# Patient Record
Sex: MALE | Race: WHITE | URBAN - METROPOLITAN AREA
[De-identification: names, ages, dates, MRNs, and addresses within clinical notes are randomized per-mention and may not be internally consistent; named-entity substitution may affect disease eponyms.]

---

## 2021-05-03 ENCOUNTER — OFFICE VISIT (OUTPATIENT)
Dept: URBAN - METROPOLITAN AREA CLINIC 23 | Facility: CLINIC | Age: 81
End: 2021-05-03

## 2021-11-24 ENCOUNTER — TELEPHONE ENCOUNTER (OUTPATIENT)
Dept: URBAN - METROPOLITAN AREA CLINIC 19 | Facility: CLINIC | Age: 81
End: 2021-11-24

## 2021-11-24 RX ORDER — PANTOPRAZOLE SODIUM 40 MG/1
1 TABLET TABLET, DELAYED RELEASE ORAL BID
Qty: 180 TABLET | Refills: 1 | OUTPATIENT
Start: 2021-11-30

## 2022-02-04 ENCOUNTER — TELEPHONE ENCOUNTER (OUTPATIENT)
Dept: URBAN - METROPOLITAN AREA CLINIC 77 | Facility: CLINIC | Age: 82
End: 2022-02-04

## 2022-02-04 RX ORDER — PANTOPRAZOLE SODIUM 40 MG/1
1 TABLET TABLET, DELAYED RELEASE ORAL BID
Qty: 180 TABLET | Refills: 1
Start: 2021-11-30

## 2022-02-08 ENCOUNTER — OFFICE VISIT (OUTPATIENT)
Dept: URBAN - METROPOLITAN AREA CLINIC 23 | Facility: CLINIC | Age: 82
End: 2022-02-08
Payer: MEDICARE

## 2022-02-08 ENCOUNTER — LAB OUTSIDE AN ENCOUNTER (OUTPATIENT)
Dept: URBAN - METROPOLITAN AREA CLINIC 23 | Facility: CLINIC | Age: 82
End: 2022-02-08

## 2022-02-08 DIAGNOSIS — K55.20 ARTERIOVENOUS MALFORMATION OF INTESTINE: ICD-10-CM

## 2022-02-08 DIAGNOSIS — K92.1 MELENA: ICD-10-CM

## 2022-02-08 PROCEDURE — 99214 OFFICE O/P EST MOD 30 MIN: CPT | Performed by: INTERNAL MEDICINE

## 2022-02-08 PROCEDURE — G8420 CALC BMI NORM PARAMETERS: HCPCS | Performed by: INTERNAL MEDICINE

## 2022-02-08 PROCEDURE — G8427 DOCREV CUR MEDS BY ELIG CLIN: HCPCS | Performed by: INTERNAL MEDICINE

## 2022-02-08 PROCEDURE — 3017F COLORECTAL CA SCREEN DOC REV: CPT | Performed by: INTERNAL MEDICINE

## 2022-02-08 PROCEDURE — 1036F TOBACCO NON-USER: CPT | Performed by: INTERNAL MEDICINE

## 2022-02-08 PROCEDURE — G9622 NO UNHEAL ETOH USER: HCPCS | Performed by: INTERNAL MEDICINE

## 2022-02-08 PROCEDURE — G9903 PT SCRN TBCO ID AS NON USER: HCPCS | Performed by: INTERNAL MEDICINE

## 2022-02-08 RX ORDER — IRON HEME POLYPEPTIDE/FOLIC AC 12-1MG
TAKE 1 TABLET BY ORAL ROUTE ONCE DAILY WITH OR WITHOUT FOOD TABLET ORAL 1
Qty: 0 | Refills: 0 | Status: ACTIVE | COMMUNITY
Start: 1900-01-01

## 2022-02-08 RX ORDER — PANTOPRAZOLE SODIUM 40 MG/1
1 TABLET TABLET, DELAYED RELEASE ORAL BID
Qty: 180 TABLET | Refills: 1 | Status: ACTIVE | COMMUNITY
Start: 2021-11-30

## 2022-02-08 RX ORDER — FINASTERIDE 5 MG/1
TAKE 1 TABLET (5 MG) BY ORAL ROUTE ONCE DAILY TABLET, FILM COATED ORAL 1
Qty: 0 | Refills: 0 | Status: ACTIVE | COMMUNITY
Start: 1900-01-01

## 2022-02-08 RX ORDER — ATORVASTATIN CALCIUM 40 MG/1
TAKE 1 TABLET (40 MG) BY ORAL ROUTE ONCE DAILY TABLET, FILM COATED ORAL 1
Qty: 0 | Refills: 0 | Status: ACTIVE | COMMUNITY
Start: 1900-01-01

## 2022-02-08 RX ORDER — LISINOPRIL 10 MG/1
TAKE 1 TABLET (10 MG) BY ORAL ROUTE ONCE DAILY TABLET ORAL 1
Qty: 0 | Refills: 0 | Status: ACTIVE | COMMUNITY
Start: 1900-01-01

## 2022-02-08 RX ORDER — ASCORBIC ACID 500 MG
TABLET ORAL
Qty: 0 | Refills: 0 | Status: ACTIVE | COMMUNITY
Start: 1900-01-01

## 2022-02-08 RX ORDER — ESCITALOPRAM 10 MG/1
TAKE 1 TABLET (10 MG) BY ORAL ROUTE ONCE DAILY TABLET, FILM COATED ORAL 1
Qty: 0 | Refills: 0 | Status: ACTIVE | COMMUNITY
Start: 1900-01-01

## 2022-02-08 NOTE — PREVIOUS WORKUP REVIEWED
. ENDOSCOPIES  -Push enteroscopy 1/24/2020: A few nonbleeding angiectasia in the duodenum.  Treated with APC.  2 gastric polyps, resected and clips were placed.  Normal esophagus. *Pathology: Stomach polyps x2-hyperplastic polyps. -Colonoscopy 6/2/2019: hemorrhoids. Normal distal ileum. Two small angioectasias in the ascending colon, cauterized with APC. A 3 mm polyp in the ascending colon. Diverticulosis.-EGD 5/31/2019: normal esophagus. A 10 mm polyp without active bleeding but potential culprit of recent bleeding in the prepyloric region. It was removed with hot snare. Two clips placed. 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 Normal duodenum. *Pathology: gastric antrum polyp- regenerative/hyperplastic polyp. Ascending colon polyp was - acellular debris, no colonic tissue. -Capsule endoscopy 9/27/2017: multiple proximal small bowel nonbleeding AVMs. -Capsule endoscopy 10/29/2014: single possible AVM in the meat jejunum. Otherwise unremarkable. LABS -Labs 11/22/2021: WBC 5.8, hemoglobin 9.5, platelet 250.  Ferritin 9 L, iron saturation 6% L, vitamin B12 297. -Labs 6/19/2019: hemoglobin 11.5, platelet 215. Labs 6/12/2019: hemoglobin 11.5, platelet 206, WBC 7.3. -Labs 6/3/2019: hemoglobin 9.3, platelet 240, WBC 6.3. Labs 6/1/2019: hemoglobin 7.8.

## 2022-02-08 NOTE — HPI-TODAY'S VISIT:
81-year-old male with history of recurrent GI bleeding with AVM presents for intermittent black-colored stool for past months.  No other symptoms.  Is on baby aspirin.  History of coronary artery disease. He was off PPI for a while. Resumed PPI BID since his contact a week ago.

## 2022-02-09 LAB
HEMATOCRIT: 42
HEMOGLOBIN: 12.8
MCH: 27
MCHC: 30.5
MCV: 89
NRBC: (no result)
PLATELETS: 217
RBC: 4.74
RDW: 17.7
WBC: 6.2

## 2022-03-25 ENCOUNTER — OFFICE VISIT (OUTPATIENT)
Dept: URBAN - METROPOLITAN AREA MEDICAL CENTER 27 | Facility: MEDICAL CENTER | Age: 82
End: 2022-03-25

## 2022-04-19 ENCOUNTER — LAB OUTSIDE AN ENCOUNTER (OUTPATIENT)
Dept: URBAN - METROPOLITAN AREA MEDICAL CENTER 27 | Facility: MEDICAL CENTER | Age: 82
End: 2022-04-19

## 2022-04-19 ENCOUNTER — TELEPHONE ENCOUNTER (OUTPATIENT)
Dept: URBAN - METROPOLITAN AREA CLINIC 78 | Facility: CLINIC | Age: 82
End: 2022-04-19

## 2022-04-19 ENCOUNTER — OFFICE VISIT (OUTPATIENT)
Dept: URBAN - METROPOLITAN AREA MEDICAL CENTER 27 | Facility: MEDICAL CENTER | Age: 82
End: 2022-04-19
Payer: MEDICARE

## 2022-04-19 DIAGNOSIS — K22.2 ACQUIRED ESOPHAGEAL RING: ICD-10-CM

## 2022-04-19 DIAGNOSIS — K92.1 ACUTE MELENA: ICD-10-CM

## 2022-04-19 DIAGNOSIS — K22.89 OTHER SPECIFIED DISEASE OF ESOPHAGUS: ICD-10-CM

## 2022-04-19 PROCEDURE — 44360 SMALL BOWEL ENDOSCOPY: CPT | Performed by: INTERNAL MEDICINE

## 2022-04-19 RX ORDER — IRON HEME POLYPEPTIDE/FOLIC AC 12-1MG
TAKE 1 TABLET BY ORAL ROUTE ONCE DAILY WITH OR WITHOUT FOOD TABLET ORAL 1
Qty: 0 | Refills: 0 | Status: ACTIVE | COMMUNITY
Start: 1900-01-01

## 2022-04-19 RX ORDER — ESCITALOPRAM 10 MG/1
TAKE 1 TABLET (10 MG) BY ORAL ROUTE ONCE DAILY TABLET, FILM COATED ORAL 1
Qty: 0 | Refills: 0 | Status: ACTIVE | COMMUNITY
Start: 1900-01-01

## 2022-04-19 RX ORDER — ASCORBIC ACID 500 MG
TABLET ORAL
Qty: 0 | Refills: 0 | Status: ACTIVE | COMMUNITY
Start: 1900-01-01

## 2022-04-19 RX ORDER — FINASTERIDE 5 MG/1
TAKE 1 TABLET (5 MG) BY ORAL ROUTE ONCE DAILY TABLET, FILM COATED ORAL 1
Qty: 0 | Refills: 0 | Status: ACTIVE | COMMUNITY
Start: 1900-01-01

## 2022-04-19 RX ORDER — ATORVASTATIN CALCIUM 40 MG/1
TAKE 1 TABLET (40 MG) BY ORAL ROUTE ONCE DAILY TABLET, FILM COATED ORAL 1
Qty: 0 | Refills: 0 | Status: ACTIVE | COMMUNITY
Start: 1900-01-01

## 2022-04-19 RX ORDER — LISINOPRIL 10 MG/1
TAKE 1 TABLET (10 MG) BY ORAL ROUTE ONCE DAILY TABLET ORAL 1
Qty: 0 | Refills: 0 | Status: ACTIVE | COMMUNITY
Start: 1900-01-01

## 2022-04-19 RX ORDER — POLYETHYLENE GLYCOL-3350 AND ELECTROLYTES WITH FLAVOR PACK 240; 5.84; 2.98; 6.72; 22.72 G/278.26G; G/278.26G; G/278.26G; G/278.26G; G/278.26G
AS DIRECTED POWDER, FOR SOLUTION ORAL ONCE
Qty: 1 KIT | Refills: 0 | OUTPATIENT
Start: 2022-04-19 | End: 2022-04-20

## 2022-04-19 RX ORDER — PANTOPRAZOLE SODIUM 40 MG/1
1 TABLET TABLET, DELAYED RELEASE ORAL BID
Qty: 180 TABLET | Refills: 1 | Status: ACTIVE | COMMUNITY
Start: 2021-11-30

## 2022-04-20 ENCOUNTER — LAB OUTSIDE AN ENCOUNTER (OUTPATIENT)
Dept: URBAN - METROPOLITAN AREA CLINIC 78 | Facility: CLINIC | Age: 82
End: 2022-04-20

## 2022-04-20 ENCOUNTER — TELEPHONE ENCOUNTER (OUTPATIENT)
Dept: URBAN - METROPOLITAN AREA CLINIC 78 | Facility: CLINIC | Age: 82
End: 2022-04-20

## 2022-04-20 ENCOUNTER — OFFICE VISIT (OUTPATIENT)
Dept: URBAN - METROPOLITAN AREA MEDICAL CENTER 27 | Facility: MEDICAL CENTER | Age: 82
End: 2022-04-20
Payer: MEDICARE

## 2022-04-20 DIAGNOSIS — K92.1 ACUTE MELENA: ICD-10-CM

## 2022-04-20 PROCEDURE — 45378 DIAGNOSTIC COLONOSCOPY: CPT | Performed by: INTERNAL MEDICINE

## 2022-04-20 RX ORDER — POLYETHYLENE GLYCOL-3350 AND ELECTROLYTES WITH FLAVOR PACK 240; 5.84; 2.98; 6.72; 22.72 G/278.26G; G/278.26G; G/278.26G; G/278.26G; G/278.26G
AS DIRECTED POWDER, FOR SOLUTION ORAL ONCE
Qty: 1 KIT | Refills: 0 | Status: ACTIVE | COMMUNITY
Start: 2022-04-19 | End: 2022-04-20

## 2022-04-20 RX ORDER — FINASTERIDE 5 MG/1
TAKE 1 TABLET (5 MG) BY ORAL ROUTE ONCE DAILY TABLET, FILM COATED ORAL 1
Qty: 0 | Refills: 0 | Status: ACTIVE | COMMUNITY
Start: 1900-01-01

## 2022-04-20 RX ORDER — LISINOPRIL 10 MG/1
TAKE 1 TABLET (10 MG) BY ORAL ROUTE ONCE DAILY TABLET ORAL 1
Qty: 0 | Refills: 0 | Status: ACTIVE | COMMUNITY
Start: 1900-01-01

## 2022-04-20 RX ORDER — ESCITALOPRAM 10 MG/1
TAKE 1 TABLET (10 MG) BY ORAL ROUTE ONCE DAILY TABLET, FILM COATED ORAL 1
Qty: 0 | Refills: 0 | Status: ACTIVE | COMMUNITY
Start: 1900-01-01

## 2022-04-20 RX ORDER — ASCORBIC ACID 500 MG
TABLET ORAL
Qty: 0 | Refills: 0 | Status: ACTIVE | COMMUNITY
Start: 1900-01-01

## 2022-04-20 RX ORDER — PANTOPRAZOLE SODIUM 40 MG/1
1 TABLET TABLET, DELAYED RELEASE ORAL BID
Qty: 180 TABLET | Refills: 1 | Status: ACTIVE | COMMUNITY
Start: 2021-11-30

## 2022-04-20 RX ORDER — ATORVASTATIN CALCIUM 40 MG/1
TAKE 1 TABLET (40 MG) BY ORAL ROUTE ONCE DAILY TABLET, FILM COATED ORAL 1
Qty: 0 | Refills: 0 | Status: ACTIVE | COMMUNITY
Start: 1900-01-01

## 2022-04-20 RX ORDER — IRON HEME POLYPEPTIDE/FOLIC AC 12-1MG
TAKE 1 TABLET BY ORAL ROUTE ONCE DAILY WITH OR WITHOUT FOOD TABLET ORAL 1
Qty: 0 | Refills: 0 | Status: ACTIVE | COMMUNITY
Start: 1900-01-01

## 2022-05-12 ENCOUNTER — CLAIMS CREATED FROM THE CLAIM WINDOW (OUTPATIENT)
Dept: URBAN - METROPOLITAN AREA CLINIC 22 | Facility: CLINIC | Age: 82
End: 2022-05-12

## 2022-05-12 ENCOUNTER — OFFICE VISIT (OUTPATIENT)
Dept: URBAN - METROPOLITAN AREA CLINIC 22 | Facility: CLINIC | Age: 82
End: 2022-05-12

## 2022-05-12 ENCOUNTER — CLAIMS CREATED FROM THE CLAIM WINDOW (OUTPATIENT)
Dept: URBAN - METROPOLITAN AREA CLINIC 22 | Facility: CLINIC | Age: 82
End: 2022-05-12
Payer: MEDICARE

## 2022-05-12 DIAGNOSIS — D50.9 IRON DEFICIENCY ANEMIA: ICD-10-CM

## 2022-05-12 DIAGNOSIS — K92.1 MELENA: ICD-10-CM

## 2022-05-12 PROCEDURE — 91110 GI TRC IMG INTRAL ESOPH-ILE: CPT | Performed by: INTERNAL MEDICINE

## 2022-05-24 ENCOUNTER — TELEPHONE ENCOUNTER (OUTPATIENT)
Dept: URBAN - METROPOLITAN AREA CLINIC 77 | Facility: CLINIC | Age: 82
End: 2022-05-24

## 2022-10-10 ENCOUNTER — OFFICE VISIT (OUTPATIENT)
Dept: URBAN - METROPOLITAN AREA CLINIC 23 | Facility: CLINIC | Age: 82
End: 2022-10-10
Payer: MEDICARE

## 2022-10-10 ENCOUNTER — WEB ENCOUNTER (OUTPATIENT)
Dept: URBAN - METROPOLITAN AREA CLINIC 23 | Facility: CLINIC | Age: 82
End: 2022-10-10

## 2022-10-10 VITALS
DIASTOLIC BLOOD PRESSURE: 70 MMHG | BODY MASS INDEX: 32.82 KG/M2 | TEMPERATURE: 97.6 F | HEIGHT: 69 IN | SYSTOLIC BLOOD PRESSURE: 151 MMHG | HEART RATE: 62 BPM | WEIGHT: 221.6 LBS

## 2022-10-10 DIAGNOSIS — K64.8 HEMORRHOIDS, INTERNAL: ICD-10-CM

## 2022-10-10 DIAGNOSIS — K92.1 MELENA: ICD-10-CM

## 2022-10-10 DIAGNOSIS — D50.0 ANEMIA DUE TO BLOOD LOSS: ICD-10-CM

## 2022-10-10 DIAGNOSIS — D50.8 ACQUIRED IRON DEFICIENCY ANEMIA DUE TO DECREASED ABSORPTION: ICD-10-CM

## 2022-10-10 PROBLEM — 413532003: Status: ACTIVE | Noted: 2022-04-19

## 2022-10-10 PROCEDURE — G8420 CALC BMI NORM PARAMETERS: HCPCS | Performed by: INTERNAL MEDICINE

## 2022-10-10 PROCEDURE — 99214 OFFICE O/P EST MOD 30 MIN: CPT | Performed by: INTERNAL MEDICINE

## 2022-10-10 PROCEDURE — 3017F COLORECTAL CA SCREEN DOC REV: CPT | Performed by: INTERNAL MEDICINE

## 2022-10-10 PROCEDURE — 1036F TOBACCO NON-USER: CPT | Performed by: INTERNAL MEDICINE

## 2022-10-10 PROCEDURE — G9622 NO UNHEAL ETOH USER: HCPCS | Performed by: INTERNAL MEDICINE

## 2022-10-10 PROCEDURE — G8427 DOCREV CUR MEDS BY ELIG CLIN: HCPCS | Performed by: INTERNAL MEDICINE

## 2022-10-10 RX ORDER — FINASTERIDE 5 MG/1
TAKE 1 TABLET (5 MG) BY ORAL ROUTE ONCE DAILY TABLET, FILM COATED ORAL 1
Qty: 0 | Refills: 0 | Status: ACTIVE | COMMUNITY
Start: 1900-01-01

## 2022-10-10 RX ORDER — ASCORBIC ACID 500 MG
TABLET ORAL
Qty: 0 | Refills: 0 | Status: ACTIVE | COMMUNITY
Start: 1900-01-01

## 2022-10-10 RX ORDER — PANTOPRAZOLE SODIUM 40 MG/1
1 TABLET TABLET, DELAYED RELEASE ORAL BID
Qty: 180 TABLET | Refills: 1 | Status: ACTIVE | COMMUNITY
Start: 2021-11-30

## 2022-10-10 RX ORDER — ATORVASTATIN CALCIUM 40 MG/1
TAKE 1 TABLET (40 MG) BY ORAL ROUTE ONCE DAILY TABLET, FILM COATED ORAL 1
Qty: 0 | Refills: 0 | Status: ACTIVE | COMMUNITY
Start: 1900-01-01

## 2022-10-10 RX ORDER — LISINOPRIL 10 MG/1
TAKE 1 TABLET (10 MG) BY ORAL ROUTE ONCE DAILY TABLET ORAL 1
Qty: 0 | Refills: 0 | Status: ACTIVE | COMMUNITY
Start: 1900-01-01

## 2022-10-10 RX ORDER — IRON HEME POLYPEPTIDE/FOLIC AC 12-1MG
TAKE 1 TABLET BY ORAL ROUTE ONCE DAILY WITH OR WITHOUT FOOD TABLET ORAL 1
Qty: 0 | Refills: 0 | Status: ACTIVE | COMMUNITY
Start: 1900-01-01

## 2022-10-10 RX ORDER — ESCITALOPRAM 10 MG/1
TAKE 1 TABLET (10 MG) BY ORAL ROUTE ONCE DAILY TABLET, FILM COATED ORAL 1
Qty: 0 | Refills: 0 | Status: ACTIVE | COMMUNITY
Start: 1900-01-01

## 2022-10-10 RX ORDER — HYDROCORTISONE ACETATE 25 MG/1
1 SUPPOSITORY SUPPOSITORY RECTAL
Qty: 28 SUPPOSITORIES | Refills: 0 | OUTPATIENT
Start: 2022-10-10 | End: 2022-10-24

## 2022-10-10 NOTE — HPI-TODAY'S VISIT:
82 old male presents complaining of soiling underwear.  No actual fecal incontinence. Protruding and bleeding sometimes.  He has known internal hemorrhoids noted on his colonoscopy.  No previous hemorrhoids treatment.  He also reports past 1 week he has had black-colored stool.  Once daily bowel movements.  He takes baby aspirin for his coronary to disease/stents placed, also on oral iron supplement. He had push enteroscopy, colonoscopy, [facility]Cam 2022 for black-colored stool, unremarkable. He is following Dr. Chavez hematologist.

## 2022-10-10 NOTE — PREVIOUS WORKUP REVIEWED
- , .ENDOSCOPIES -PillCam 5/12/2022: Normal.-Colonoscopy 4/20/2022: Normal ileum.  Normal colon.  Internal hemorrhoids.-Push enteroscopy 4/19/2022: Widely patent Schatzki's ring.  Normal esophagus, stomach, duodenum, jejunum.-Push enteroscopy 1/24/2020: A few nonbleeding angiectasia in the duodenum. Treated with APC. 2 gastric polyps, resected and clips were placed. Normal esophagus.*Pathology: Stomach polyps x2-hyperplastic polyps.-Colonoscopy 6/2/2019: hemorrhoids. Normal distal ileum. Two small angioectasias in the ascending colon, cauterized with APC. A 3 mm polyp in the ascending colon. Diverticulosis.-EGD 5/31/2019: normal esophagus. A 10 mm polyp without active bleeding but potential culprit of recent bleeding in the prepyloric region. It was removed with hot snare. Two clips placed. 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 Normal duodenum.*Pathology: gastric antrum polyp- regenerative/hyperplastic polyp. Ascending colon polyp was - acellular debris, no colonic tissue.-Capsule endoscopy 9/27/2017: multiple proximal small bowel nonbleeding AVMs.-Capsule endoscopy 10/29/2014: single possible AVM in the mid jejunum. Otherwise unremarkable.LABS-Labs 2/8/2022: WBC 6.2, hemoglobin 12.8, platelet 217.-Labs 11/22/2021: WBC 5.8, hemoglobin 9.5, platelet 250. Ferritin 9 L, iron saturation 6% L, vitamin B12 297.-Labs 6/19/2019: hemoglobin 11.5, platelet 215.-Labs 6/12/2019: hemoglobin 11.5, platelet 206, WBC 7.3.-Labs 6/3/2019: hemoglobin 9.3, platelet 240, WBC 6.3. Labs 6/1/2019: hemoglobin 7.8.

## 2022-10-11 LAB
HEMATOCRIT: 35.5
HEMOGLOBIN: 11.3
MCH: 28.3
MCHC: 31.8
MCV: 88.8
MPV: 11
PLATELET COUNT: 208
RDW: 12.7
RED BLOOD CELL COUNT: 4
WHITE BLOOD CELL COUNT: 5.7

## 2024-02-19 ENCOUNTER — OV EP (OUTPATIENT)
Dept: URBAN - METROPOLITAN AREA CLINIC 23 | Facility: CLINIC | Age: 84
End: 2024-02-19

## 2024-03-04 ENCOUNTER — OV EP (OUTPATIENT)
Dept: URBAN - METROPOLITAN AREA CLINIC 23 | Facility: CLINIC | Age: 84
End: 2024-03-04

## 2024-03-18 ENCOUNTER — LAB (OUTPATIENT)
Dept: URBAN - METROPOLITAN AREA CLINIC 23 | Facility: CLINIC | Age: 84
End: 2024-03-18

## 2024-03-18 ENCOUNTER — OV EP (OUTPATIENT)
Dept: URBAN - METROPOLITAN AREA CLINIC 23 | Facility: CLINIC | Age: 84
End: 2024-03-18
Payer: COMMERCIAL

## 2024-03-18 VITALS
HEIGHT: 69 IN | DIASTOLIC BLOOD PRESSURE: 63 MMHG | TEMPERATURE: 97.5 F | WEIGHT: 210 LBS | BODY MASS INDEX: 31.1 KG/M2 | HEART RATE: 60 BPM | SYSTOLIC BLOOD PRESSURE: 127 MMHG

## 2024-03-18 DIAGNOSIS — Z95.0 PACEMAKER: ICD-10-CM

## 2024-03-18 DIAGNOSIS — D50.0 IRON DEFICIENCY ANEMIA DUE TO CHRONIC BLOOD LOSS: ICD-10-CM

## 2024-03-18 DIAGNOSIS — K55.20 AVM (ARTERIOVENOUS MALFORMATION) OF SMALL BOWEL, ACQUIRED: ICD-10-CM

## 2024-03-18 DIAGNOSIS — K31.7 HYPERPLASTIC POLYP OF STOMACH: ICD-10-CM

## 2024-03-18 DIAGNOSIS — Z79.82 ON ASPIRIN AT HOME: ICD-10-CM

## 2024-03-18 DIAGNOSIS — K92.1 MELENA: ICD-10-CM

## 2024-03-18 PROBLEM — 78809005: Status: ACTIVE | Noted: 2024-03-18

## 2024-03-18 PROBLEM — 724556004: Status: ACTIVE | Noted: 2024-03-18

## 2024-03-18 PROBLEM — 441509002: Status: ACTIVE | Noted: 2024-03-18

## 2024-03-18 PROCEDURE — 99215 OFFICE O/P EST HI 40 MIN: CPT | Performed by: INTERNAL MEDICINE

## 2024-03-18 RX ORDER — ESCITALOPRAM 10 MG/1
TAKE 1 TABLET (10 MG) BY ORAL ROUTE ONCE DAILY TABLET, FILM COATED ORAL 1
Qty: 0 | Refills: 0 | Status: ACTIVE | COMMUNITY
Start: 1900-01-01

## 2024-03-18 RX ORDER — ASCORBIC ACID 500 MG
TABLET ORAL
Qty: 0 | Refills: 0 | Status: ACTIVE | COMMUNITY
Start: 1900-01-01

## 2024-03-18 RX ORDER — PANTOPRAZOLE SODIUM 40 MG/1
1 TABLET TABLET, DELAYED RELEASE ORAL BID
Qty: 180 TABLET | Refills: 1 | Status: ACTIVE | COMMUNITY
Start: 2021-11-30

## 2024-03-18 RX ORDER — SODIUM PICOSULFATE, MAGNESIUM OXIDE, AND ANHYDROUS CITRIC ACID 12; 3.5; 1 G/175ML; G/175ML; MG/175ML
175 ML THE FIRST DOSE THE EVENING BEFORE AND SECOND DOSE THE MORNING OF COLONOSCOPY LIQUID ORAL ONCE A DAY
Qty: 350 | OUTPATIENT
Start: 2024-03-18 | End: 2024-03-20

## 2024-03-18 RX ORDER — ATORVASTATIN CALCIUM 40 MG/1
TAKE 1 TABLET (40 MG) BY ORAL ROUTE ONCE DAILY TABLET, FILM COATED ORAL 1
Qty: 0 | Refills: 0 | Status: ACTIVE | COMMUNITY
Start: 1900-01-01

## 2024-03-18 RX ORDER — LISINOPRIL 10 MG/1
TAKE 1 TABLET (10 MG) BY ORAL ROUTE ONCE DAILY TABLET ORAL 1
Qty: 0 | Refills: 0 | Status: ACTIVE | COMMUNITY
Start: 1900-01-01

## 2024-03-18 RX ORDER — FINASTERIDE 5 MG/1
TAKE 1 TABLET (5 MG) BY ORAL ROUTE ONCE DAILY TABLET, FILM COATED ORAL 1
Qty: 0 | Refills: 0 | Status: ACTIVE | COMMUNITY
Start: 1900-01-01

## 2024-03-18 RX ORDER — IRON HEME POLYPEPTIDE/FOLIC AC 12-1MG
TAKE 1 TABLET BY ORAL ROUTE ONCE DAILY WITH OR WITHOUT FOOD TABLET ORAL 1
Qty: 0 | Refills: 0 | Status: ACTIVE | COMMUNITY
Start: 1900-01-01

## 2024-03-18 NOTE — PREVIOUS WORKUP REVIEWED EXTERNAL MEDICAL RECORD
- , .ENDOSCOPIES -PillCam 5/12/2022: Normal.-Colonoscopy 4/20/2022: Normal ileum.  Normal colon.  Internal hemorrhoids.-Push enteroscopy 4/19/2022: Widely patent Schatzki's ring.  Normal esophagus, stomach, duodenum, jejunum.-Push enteroscopy 1/24/2020: A few nonbleeding angiectasia in the duodenum. Treated with APC. 2 gastric polyps, resected and clips were placed. Normal esophagus.*Pathology: Stomach polyps x2-hyperplastic polyps.-Colonoscopy 6/2/2019: hemorrhoids. Normal distal ileum. Two small angioectasias in the ascending colon, cauterized with APC. A 3 mm polyp in the ascending colon. Diverticulosis.-EGD 5/31/2019: normal esophagus. A 10 mm polyp without active bleeding but potential culprit of recent bleeding in the prepyloric region. It was removed with hot snare. Two clips placed. 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 Normal duodenum.*Pathology: gastric antrum polyp- regenerative/hyperplastic polyp. Ascending colon polyp was - acellular debris, no colonic tissue.-Capsule endoscopy 9/27/2017: multiple proximal small bowel nonbleeding AVMs.-Capsule endoscopy 10/29/2014: single possible AVM in the mid jejunum. Otherwise unremarkable.LABS-Labs 2/8/2022: WBC 6.2, hemoglobin 12.8, platelet 217.-Labs 11/22/2021: WBC 5.8, hemoglobin 9.5, platelet 250. Ferritin 9 L, iron saturation 6% L, vitamin B12 297.-Labs 6/19/2019: hemoglobin 11.5, platelet 215.-Labs 6/12/2019: hemoglobin 11.5, platelet 206, WBC 7.3.-Labs 6/3/2019: hemoglobin 9.3, platelet 240, WBC 6.3. Labs 6/1/2019: hemoglobin 7.8. , -

## 2024-03-18 NOTE — PHYSICAL EXAM CONSTITUTIONAL:
- , well developed, well nourished , in no acute distress , ambulating without difficulty , normal communication ability , -

## 2024-03-18 NOTE — HPI-TODAY'S VISIT:
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 Blood transfusion in February 13, 1 unit with hematologist Dr. Chavez. He went to [facility] for nausea vomiting weakness, hemoglobin checked 8.3. No blood transfusion done there, discharged home. He had 1 bowel movement yesterday. He takes baby aspirin for cardiac stents.  Pacemaker was placed July 2023.

## 2024-03-22 ENCOUNTER — OTHER (OUTPATIENT)
Dept: URBAN - METROPOLITAN AREA MEDICAL CENTER 27 | Facility: MEDICAL CENTER | Age: 84
End: 2024-03-22
Payer: COMMERCIAL

## 2024-03-22 DIAGNOSIS — K92.1 ACUTE MELENA: ICD-10-CM

## 2024-03-22 DIAGNOSIS — D12.3 ADENOMA OF TRANSVERSE COLON: ICD-10-CM

## 2024-03-22 DIAGNOSIS — K55.20 ACQUIRED ARTERIOVENOUS MALFORMATION OF COLON: ICD-10-CM

## 2024-03-22 DIAGNOSIS — K31.811 ACQUIRED ARTERIOVENOUS MALFORMATION OF DUODENUM WITH HEMORRHAGE: ICD-10-CM

## 2024-03-22 DIAGNOSIS — D50.9 ANEMIA: ICD-10-CM

## 2024-03-22 PROCEDURE — 45385 COLONOSCOPY W/LESION REMOVAL: CPT | Performed by: INTERNAL MEDICINE

## 2024-03-22 PROCEDURE — 43255 EGD CONTROL BLEEDING ANY: CPT | Performed by: INTERNAL MEDICINE

## 2024-03-22 PROCEDURE — 44366 SMALL BOWEL ENDOSCOPY: CPT | Performed by: INTERNAL MEDICINE

## 2024-03-22 PROCEDURE — 43270 EGD LESION ABLATION: CPT | Performed by: INTERNAL MEDICINE

## 2024-03-22 PROCEDURE — 44369 SMALL BOWEL ENDOSCOPY: CPT | Performed by: INTERNAL MEDICINE

## 2024-03-22 RX ORDER — LISINOPRIL 10 MG/1
TAKE 1 TABLET (10 MG) BY ORAL ROUTE ONCE DAILY TABLET ORAL 1
Qty: 0 | Refills: 0 | Status: ACTIVE | COMMUNITY
Start: 1900-01-01

## 2024-03-22 RX ORDER — FINASTERIDE 5 MG/1
TAKE 1 TABLET (5 MG) BY ORAL ROUTE ONCE DAILY TABLET, FILM COATED ORAL 1
Qty: 0 | Refills: 0 | Status: ACTIVE | COMMUNITY
Start: 1900-01-01

## 2024-03-22 RX ORDER — ASCORBIC ACID 500 MG
TABLET ORAL
Qty: 0 | Refills: 0 | Status: ACTIVE | COMMUNITY
Start: 1900-01-01

## 2024-03-22 RX ORDER — ESCITALOPRAM 10 MG/1
TAKE 1 TABLET (10 MG) BY ORAL ROUTE ONCE DAILY TABLET, FILM COATED ORAL 1
Qty: 0 | Refills: 0 | Status: ACTIVE | COMMUNITY
Start: 1900-01-01

## 2024-03-22 RX ORDER — PANTOPRAZOLE SODIUM 40 MG/1
1 TABLET TABLET, DELAYED RELEASE ORAL BID
Qty: 180 TABLET | Refills: 1 | Status: ACTIVE | COMMUNITY
Start: 2021-11-30

## 2024-03-22 RX ORDER — IRON HEME POLYPEPTIDE/FOLIC AC 12-1MG
TAKE 1 TABLET BY ORAL ROUTE ONCE DAILY WITH OR WITHOUT FOOD TABLET ORAL 1
Qty: 0 | Refills: 0 | Status: ACTIVE | COMMUNITY
Start: 1900-01-01

## 2024-03-22 RX ORDER — ATORVASTATIN CALCIUM 40 MG/1
TAKE 1 TABLET (40 MG) BY ORAL ROUTE ONCE DAILY TABLET, FILM COATED ORAL 1
Qty: 0 | Refills: 0 | Status: ACTIVE | COMMUNITY
Start: 1900-01-01

## 2024-04-10 ENCOUNTER — PILLCAM (OUTPATIENT)
Dept: URBAN - METROPOLITAN AREA CLINIC 23 | Facility: CLINIC | Age: 84
End: 2024-04-10

## 2024-05-03 ENCOUNTER — TELEPHONE ENCOUNTER (OUTPATIENT)
Dept: URBAN - METROPOLITAN AREA CLINIC 23 | Facility: CLINIC | Age: 84
End: 2024-05-03

## 2024-05-24 ENCOUNTER — DASHBOARD ENCOUNTERS (OUTPATIENT)
Age: 84
End: 2024-05-24

## 2024-05-24 ENCOUNTER — OFFICE VISIT (OUTPATIENT)
Dept: URBAN - METROPOLITAN AREA CLINIC 23 | Facility: CLINIC | Age: 84
End: 2024-05-24

## 2024-05-24 VITALS
DIASTOLIC BLOOD PRESSURE: 55 MMHG | HEIGHT: 69 IN | BODY MASS INDEX: 30.51 KG/M2 | SYSTOLIC BLOOD PRESSURE: 116 MMHG | HEART RATE: 61 BPM | WEIGHT: 206 LBS | TEMPERATURE: 97.7 F

## 2024-05-24 RX ORDER — ATORVASTATIN CALCIUM 40 MG/1
TAKE 1 TABLET (40 MG) BY ORAL ROUTE ONCE DAILY TABLET, FILM COATED ORAL 1
Qty: 0 | Refills: 0 | Status: ACTIVE | COMMUNITY
Start: 1900-01-01

## 2024-05-24 RX ORDER — LISINOPRIL 10 MG/1
TAKE 1 TABLET (10 MG) BY ORAL ROUTE ONCE DAILY TABLET ORAL 1
Qty: 0 | Refills: 0 | Status: ACTIVE | COMMUNITY
Start: 1900-01-01

## 2024-05-24 RX ORDER — PANTOPRAZOLE SODIUM 40 MG/1
1 TABLET TABLET, DELAYED RELEASE ORAL BID
Qty: 180 TABLET | Refills: 1 | Status: ACTIVE | COMMUNITY
Start: 2021-11-30

## 2024-05-24 RX ORDER — ESCITALOPRAM 10 MG/1
TAKE 1 TABLET (10 MG) BY ORAL ROUTE ONCE DAILY TABLET, FILM COATED ORAL 1
Qty: 0 | Refills: 0 | Status: ACTIVE | COMMUNITY
Start: 1900-01-01

## 2024-05-24 RX ORDER — ASCORBIC ACID 500 MG
TABLET ORAL
Qty: 0 | Refills: 0 | Status: ACTIVE | COMMUNITY
Start: 1900-01-01

## 2024-05-24 RX ORDER — IRON HEME POLYPEPTIDE/FOLIC AC 12-1MG
TAKE 1 TABLET BY ORAL ROUTE ONCE DAILY WITH OR WITHOUT FOOD TABLET ORAL 1
Qty: 0 | Refills: 0 | Status: ACTIVE | COMMUNITY
Start: 1900-01-01

## 2024-05-24 RX ORDER — FINASTERIDE 5 MG/1
TAKE 1 TABLET (5 MG) BY ORAL ROUTE ONCE DAILY TABLET, FILM COATED ORAL 1
Qty: 0 | Refills: 0 | Status: ACTIVE | COMMUNITY
Start: 1900-01-01

## 2024-06-13 ENCOUNTER — OFFICE VISIT (OUTPATIENT)
Dept: URBAN - METROPOLITAN AREA CLINIC 22 | Facility: CLINIC | Age: 84
End: 2024-06-13

## 2024-06-17 ENCOUNTER — OFFICE VISIT (OUTPATIENT)
Dept: URBAN - METROPOLITAN AREA CLINIC 23 | Facility: CLINIC | Age: 84
End: 2024-06-17

## 2024-06-20 PROBLEM — 27550009: Status: ACTIVE | Noted: 2024-06-20

## 2024-06-20 PROBLEM — 413533008: Status: ACTIVE | Noted: 2024-06-20
